# Patient Record
Sex: MALE | Race: WHITE | NOT HISPANIC OR LATINO | Employment: FULL TIME | URBAN - METROPOLITAN AREA
[De-identification: names, ages, dates, MRNs, and addresses within clinical notes are randomized per-mention and may not be internally consistent; named-entity substitution may affect disease eponyms.]

---

## 2023-08-01 ENCOUNTER — OFFICE VISIT (OUTPATIENT)
Dept: ORTHOPEDICS | Facility: CLINIC | Age: 58
End: 2023-08-01
Payer: COMMERCIAL

## 2023-08-01 DIAGNOSIS — G56.21 ULNAR NEUROPATHY OF RIGHT UPPER EXTREMITY: Primary | ICD-10-CM

## 2023-08-01 DIAGNOSIS — G56.01 CARPAL TUNNEL SYNDROME OF RIGHT WRIST: ICD-10-CM

## 2023-08-01 PROCEDURE — 1159F MED LIST DOCD IN RCRD: CPT | Mod: CPTII,S$GLB,, | Performed by: PHYSICIAN ASSISTANT

## 2023-08-01 PROCEDURE — 1160F RVW MEDS BY RX/DR IN RCRD: CPT | Mod: CPTII,S$GLB,, | Performed by: PHYSICIAN ASSISTANT

## 2023-08-01 PROCEDURE — 1159F PR MEDICATION LIST DOCUMENTED IN MEDICAL RECORD: ICD-10-PCS | Mod: CPTII,S$GLB,, | Performed by: PHYSICIAN ASSISTANT

## 2023-08-01 PROCEDURE — 99999 PR PBB SHADOW E&M-NEW PATIENT-LVL III: ICD-10-PCS | Mod: PBBFAC,,, | Performed by: PHYSICIAN ASSISTANT

## 2023-08-01 PROCEDURE — 1160F PR REVIEW ALL MEDS BY PRESCRIBER/CLIN PHARMACIST DOCUMENTED: ICD-10-PCS | Mod: CPTII,S$GLB,, | Performed by: PHYSICIAN ASSISTANT

## 2023-08-01 PROCEDURE — 99999 PR PBB SHADOW E&M-NEW PATIENT-LVL III: CPT | Mod: PBBFAC,,, | Performed by: PHYSICIAN ASSISTANT

## 2023-08-01 PROCEDURE — 4010F PR ACE/ARB THEARPY RXD/TAKEN: ICD-10-PCS | Mod: CPTII,S$GLB,, | Performed by: PHYSICIAN ASSISTANT

## 2023-08-01 PROCEDURE — 4010F ACE/ARB THERAPY RXD/TAKEN: CPT | Mod: CPTII,S$GLB,, | Performed by: PHYSICIAN ASSISTANT

## 2023-08-01 PROCEDURE — 99204 OFFICE O/P NEW MOD 45 MIN: CPT | Mod: S$GLB,,, | Performed by: PHYSICIAN ASSISTANT

## 2023-08-01 PROCEDURE — 99204 PR OFFICE/OUTPT VISIT, NEW, LEVL IV, 45-59 MIN: ICD-10-PCS | Mod: S$GLB,,, | Performed by: PHYSICIAN ASSISTANT

## 2023-08-01 NOTE — PROGRESS NOTES
8/1/2023    Chief Complaint:  Chief Complaint   Patient presents with    Right Hand - Numbness       HPI:  Albert Curtis is a 58 y.o. male, who presents to clinic today for evaluation of his right hand numbness, weakness, and tingling.  Denies any significant pain associated with the numbness and tingling.  States he does have nocturnal paresthesias.  States he is noticed symptoms approximately 1 month ago.  Denies any previous medical treatment for this issue.  Denies any acute injuries he can recall.  Denies any other complaints at this time.    PMHX:  Past Medical History:   Diagnosis Date    Hypertension        PSHX:  History reviewed. No pertinent surgical history.    FMHX:  History reviewed. No pertinent family history.    SOCHX:  Social History     Tobacco Use    Smoking status: Not on file    Smokeless tobacco: Not on file   Substance Use Topics    Alcohol use: Not on file       ALLERGIES:  Patient has no known allergies.    CURRENT MEDICATIONS:  Current Outpatient Medications on File Prior to Visit   Medication Sig Dispense Refill    RAMIPRIL ORAL ramipril Take No date recorded No form recorded No frequency recorded No route recorded No set duration recorded No set duration amount recorded active No dosage strength recorded No dosage strength units of measure recorded       No current facility-administered medications on file prior to visit.       REVIEW OF SYSTEMS:  Review of Systems   Constitutional: Negative.    HENT: Negative.     Eyes: Negative.    Respiratory: Negative.     Cardiovascular: Negative.    Gastrointestinal: Negative.    Genitourinary: Negative.    Musculoskeletal: Negative.    Skin: Negative.    Neurological:  Positive for tingling and weakness.   Endo/Heme/Allergies: Negative.    Psychiatric/Behavioral: Negative.       GENERAL PHYSICAL EXAM:   There were no vitals taken for this visit.   GEN: well developed, well nourished, no acute distress   HENT: Normocephalic, atraumatic   EYES: No  discharge, conjunctiva normal   NECK: Supple, non-tender   PULM: No wheezing, no respiratory distress   CV: RRR   ABD: Soft, non-tender    ORTHO EXAM:   Examination of the right hand reveals atrophy of the 1st webspace musculature and hypothenar musculature.  No evidence of thenar atrophy.  Able make composite fist and fully extend all fingers.  No significant tenderness to palpation throughout the entirety of the right hand/wrist.  5/5 /intrinsic strength.  5/5 thenar strength.  4/5 thumb adduction strength.  4/5 hypothenar strength.  Positive cubital Tinel's test.  Positive carpal Tinel's test.  Positive Durkan's test.  Normal sensation in the radial and median nerve distributions.  Reduced sensation in the ulnar nerve distribution.  Capillary refill less than 2 seconds in all fingers.    RADIOLOGY:   None.    ASSESSMENT:   Right carpal tunnel syndrome, severe right cubital tunnel syndrome    PLAN:  1. I discussed with Albert Curtis the carpal tunnel syndrome and cubital tunnel syndrome pathology and treatment options in detail during today's visit.  After treatment options were discussed, we decided the best course of action this time is perform an EMG nerve conduction study to evaluate the severity of his carpal tunnel and cubital tunnel syndrome.  We discussed in the meantime we will perform nighttime splinting via a right carpal tunnel splint.  He verbally agreed with the treatment plan.    2.  He was scheduled for an EMG nerve conduction study of the right upper extremity in clinic today.      3. He was provided a right carpal tunnel splint in clinic today.  He was instructed to wear it every night until seen again in clinic.  He verbalized understanding.      4. I would like him follow-up in clinic after the EMG to discuss the results with Dr. Cooper.  He was instructed to contact the clinic for any problems or concerns in the interim.

## 2023-08-11 ENCOUNTER — OFFICE VISIT (OUTPATIENT)
Dept: PHYSICAL MEDICINE AND REHAB | Facility: CLINIC | Age: 58
End: 2023-08-11
Payer: COMMERCIAL

## 2023-08-11 VITALS — WEIGHT: 170.75 LBS

## 2023-08-11 DIAGNOSIS — G56.21 CUBITAL TUNNEL SYNDROME ON RIGHT: ICD-10-CM

## 2023-08-11 DIAGNOSIS — G56.01 RIGHT CARPAL TUNNEL SYNDROME: ICD-10-CM

## 2023-08-11 PROCEDURE — 95886 MUSC TEST DONE W/N TEST COMP: CPT | Mod: S$GLB,,, | Performed by: PHYSICAL MEDICINE & REHABILITATION

## 2023-08-11 PROCEDURE — 95909 NRV CNDJ TST 5-6 STUDIES: CPT | Mod: S$GLB,,, | Performed by: PHYSICAL MEDICINE & REHABILITATION

## 2023-08-11 PROCEDURE — 99999 PR PBB SHADOW E&M-EST. PATIENT-LVL III: CPT | Mod: PBBFAC,,, | Performed by: PHYSICAL MEDICINE & REHABILITATION

## 2023-08-11 PROCEDURE — 95909 PR NERVE CONDUCTION STUDY; 5-6 STUDIES: ICD-10-PCS | Mod: S$GLB,,, | Performed by: PHYSICAL MEDICINE & REHABILITATION

## 2023-08-11 PROCEDURE — 95886 PR EMG COMPLETE, W/ NERVE CONDUCTION STUDIES, 5+ MUSCLES: ICD-10-PCS | Mod: S$GLB,,, | Performed by: PHYSICAL MEDICINE & REHABILITATION

## 2023-08-11 PROCEDURE — 99999 PR PBB SHADOW E&M-EST. PATIENT-LVL III: ICD-10-PCS | Mod: PBBFAC,,, | Performed by: PHYSICAL MEDICINE & REHABILITATION

## 2023-08-11 PROCEDURE — 99499 UNLISTED E&M SERVICE: CPT | Mod: S$GLB,,, | Performed by: PHYSICAL MEDICINE & REHABILITATION

## 2023-08-11 PROCEDURE — 99499 NO LOS: ICD-10-PCS | Mod: S$GLB,,, | Performed by: PHYSICAL MEDICINE & REHABILITATION

## 2023-08-11 NOTE — PROGRESS NOTES
[facility]  [address] [facility]  [place] [address]             Full Name: Albert Curtis Gender: Male  Patient ID: [id number] YOB: 1965  History: Right hand pain with numbness and weakness. No hx of neck surgery. Hx of alcohol abuse.       Visit Date: 8/11/2023 10:09 AM  Age: 58 Years  Examining Physician: Lexus Terry DO  Referring Physician:  CASSANDRA Atkinson      Sensory NCS      Nerve / Sites Rec. Site Onset Lat Peak Lat NP Amp PP Amp Segments Distance Velocity     ms ms µV µV  cm m/s   R Median - Digit III (Antidromic)      Wrist Dig III 5.13 6.29 8.4 18.3 Wrist - Dig III 14 27   R Ulnar - Digit V (Antidromic)      Wrist Dig V NR NR NR NR Wrist - Dig V 14 NR   R Radial - Anatomical snuff box (Forearm)      Forearm Wrist 2.10 2.90 19.8 17.5 Forearm - Wrist 10 48       Motor NCS      Nerve / Sites Muscle Latency Amplitude Amp % Duration Segments Distance Lat Diff Velocity     ms mV % ms  cm ms m/s   R Median - APB      Wrist APB 6.38 8.1 100 6.85 Wrist - APB 8        Elbow APB 10.63 8.2 101 6.88 Elbow - Wrist 22 4.25 52   R Ulnar - ADM      Wrist ADM 9.60 0.3 100 5.67 Wrist - ADM 8        B.Elbow ADM 26.69 0.1 14.4 5.48 B.Elbow - Wrist 25 17.08 15      A.Elbow ADM 34.69 0.0 6.99  A.Elbow - B.Elbow 10 8.00 12       EMG Summary Table     Spontaneous MUAP Recruitment   Muscle IA Fib PSW Fasc CRD Amp Dur. Poly Pattern   R. Deltoid N None None None None N N None N   R. Biceps brachii N None None None None N N None N   R. Triceps brachii N None None None None N N None N   R. Pronator teres N None None None None N N None N   R. Abductor pollicis brevis N None None None None N N None N   R. First dorsal interosseous N 3+ 3+ None None 2- 3+ 2+ Discrete       Summary    The motor conduction test had results outside of the specified normal range in all 2 of the tested nerves:  In the R Median - APB study  the take off latency result was increased for Wrist stimulation  In the R Ulnar - ADM  study  the take off latency result was increased for Wrist stimulation  the peak amplitude result was reduced for Wrist stimulation  the take off velocity result was reduced for B.Elbow - Wrist segment  the take off velocity result was reduced for A.Elbow - B.Elbow segment    The sensory conduction test was performed on 3 nerve(s). The results were normal in 1 nerve(s): R Radial - Anatomical snuff box (Forearm). Results outside the specified normal range were found in 2 nerve(s), as follows:  In the R Median - Digit III (Antidromic) study  the peak latency result was increased for Wrist stimulation  the peak amplitude result was reduced for Wrist stimulation  In the R Ulnar - Digit V (Antidromic) study  the response was considered absent for Wrist stimulation    The needle EMG examination was performed in 6 muscles. It was normal in 5 muscle(s): R. Deltoid, R. Biceps brachii, R. Triceps brachii, R. Pronator teres, R. Abductor pollicis brevis. The study was abnormal in 1 muscle(s), with the following distribution:  The R. First dorsal interosseous had abnormal spontaneous activity, abnormal MUP waveforms, abnormal interference pattern.       Impression:  Severe right cubital tunnel syndrome with active denervation.   Moderate right carpal tunnel syndrome, without active denervation.   No electrophysiologic evidence of right cervical radiculopathy/plexpathy, or peripheral sensory neuropathy affecting the right upper extremity.       ------------------------------  Lexus Terry,

## 2023-08-14 ENCOUNTER — OFFICE VISIT (OUTPATIENT)
Dept: ORTHOPEDICS | Facility: CLINIC | Age: 58
End: 2023-08-14
Payer: COMMERCIAL

## 2023-08-14 VITALS — WEIGHT: 170 LBS

## 2023-08-14 DIAGNOSIS — G56.21 CUBITAL TUNNEL SYNDROME ON RIGHT: ICD-10-CM

## 2023-08-14 DIAGNOSIS — G56.01 CARPAL TUNNEL SYNDROME ON RIGHT: Primary | ICD-10-CM

## 2023-08-14 PROCEDURE — 99999 PR PBB SHADOW E&M-EST. PATIENT-LVL II: ICD-10-PCS | Mod: PBBFAC,,, | Performed by: ORTHOPAEDIC SURGERY

## 2023-08-14 PROCEDURE — 99204 PR OFFICE/OUTPT VISIT, NEW, LEVL IV, 45-59 MIN: ICD-10-PCS | Mod: S$GLB,,, | Performed by: ORTHOPAEDIC SURGERY

## 2023-08-14 PROCEDURE — 4010F ACE/ARB THERAPY RXD/TAKEN: CPT | Mod: CPTII,S$GLB,, | Performed by: ORTHOPAEDIC SURGERY

## 2023-08-14 PROCEDURE — 1159F MED LIST DOCD IN RCRD: CPT | Mod: CPTII,S$GLB,, | Performed by: ORTHOPAEDIC SURGERY

## 2023-08-14 PROCEDURE — 99204 OFFICE O/P NEW MOD 45 MIN: CPT | Mod: S$GLB,,, | Performed by: ORTHOPAEDIC SURGERY

## 2023-08-14 PROCEDURE — 99999 PR PBB SHADOW E&M-EST. PATIENT-LVL II: CPT | Mod: PBBFAC,,, | Performed by: ORTHOPAEDIC SURGERY

## 2023-08-14 PROCEDURE — 1159F PR MEDICATION LIST DOCUMENTED IN MEDICAL RECORD: ICD-10-PCS | Mod: CPTII,S$GLB,, | Performed by: ORTHOPAEDIC SURGERY

## 2023-08-14 PROCEDURE — 4010F PR ACE/ARB THEARPY RXD/TAKEN: ICD-10-PCS | Mod: CPTII,S$GLB,, | Performed by: ORTHOPAEDIC SURGERY

## 2023-08-14 NOTE — H&P (VIEW-ONLY)
8/14/2023    Chief Complaint:  Chief Complaint   Patient presents with    Right Hand - Pain     EMG results     Right Elbow - Pain     EMG results        HPI:  Albert Curtis is a 58 y.o. male, who presents to clinic today has a history of numbness and tingling to his right hand.  He states he has also had severe weakness.  He was noted have severe cubital tunnel syndrome and carpal tunnel syndrome.  He was sent for nerve conduction test.  He is here today for further evaluation and treatment options.    PMHX:  Past Medical History:   Diagnosis Date    Hypertension        PSHX:  No past surgical history on file.    FMHX:  No family history on file.    SOCHX:  Social History     Tobacco Use    Smoking status: Never    Smokeless tobacco: Never   Substance Use Topics    Alcohol use: Not on file       ALLERGIES:  Patient has no known allergies.    CURRENT MEDICATIONS:  Current Outpatient Medications on File Prior to Visit   Medication Sig Dispense Refill    RAMIPRIL ORAL ramipril Take No date recorded No form recorded No frequency recorded No route recorded No set duration recorded No set duration amount recorded active No dosage strength recorded No dosage strength units of measure recorded       No current facility-administered medications on file prior to visit.       REVIEW OF SYSTEMS:  ROS    GENERAL PHYSICAL EXAM:   Wt 77.1 kg (170 lb)    GEN: well developed, well nourished, no acute distress   HENT: Normocephalic, atraumatic   EYES: No discharge, conjunctiva normal   NECK: Supple, non-tender   PULM: No wheezing, no respiratory distress   CV: RRR   ABD: Soft, non-tender    ORTHO EXAM:   Examination of the right upper extremity reveals that there is no edema.  He does have some changes consistent with chronic exposure to chemicals or cement.  Palpation does not produce tenderness but he has severe decreased sensation in the ulnar distribution with mild in the median distribution.  He does have intrinsic and hypothenar  muscular atrophy.  Thenar musculature appears to be intact.  He reports overall intact sensation in the median and radial distributions.  He has a markedly positive Tinel's over the cubital tunnel and mildly positive Tinel's at the carpal tunnel.  He also has a positive Durkan's test at the carpal tunnel.  He has 3/5 strength of the intrinsic musculature of the hand and 5/5 strength of the thenar musculature.  He does have a 2+ radial pulse    RADIOLOGY:   EMG nerve conduction study has been reviewed.  He was noted have profound right cubital tunnel syndrome and severe right carpal tunnel syndrome.  There is evidence of motor dysfunction in the ulnar distribution    ASSESSMENT:   Right cubital tunnel and carpal tunnel syndromes    PLAN:  1. I have discussed treatment with the patient.  I have discussed the possibility of carpal tunnel release and ulnar nerve decompression with possible transposition.  After discussion of the risks and benefits of the procedure informed consent has been obtained    2.  Will proceed with right carpal tunnel release and right ulnar nerve decompression at the elbow with possible transposition     Three.  He will follow up with me 2 weeks postoperatively

## 2023-08-16 ENCOUNTER — TELEPHONE (OUTPATIENT)
Dept: ORTHOPEDICS | Facility: CLINIC | Age: 58
End: 2023-08-16
Payer: COMMERCIAL

## 2023-08-16 NOTE — TELEPHONE ENCOUNTER
Returned call to pt, he stated he wants to verify he can schedule next month, he stated he will call back once he confirms a sx date with work.

## 2023-08-16 NOTE — TELEPHONE ENCOUNTER
----- Message from Obdulio Santos sent at 8/16/2023  3:46 PM CDT -----  Regarding: wants to zaria Sx, call pt   Contact: pt   wants to zaria Sx, call pt

## 2023-08-17 ENCOUNTER — TELEPHONE (OUTPATIENT)
Dept: ORTHOPEDICS | Facility: CLINIC | Age: 58
End: 2023-08-17
Payer: COMMERCIAL

## 2023-08-17 NOTE — TELEPHONE ENCOUNTER
----- Message from Isaias Lopes MA sent at 8/17/2023 12:53 PM CDT -----  Contact: patient  Wants to schedule surgery for 9/5/23.    Call back number is [phone]

## 2023-08-21 DIAGNOSIS — G56.01 CARPAL TUNNEL SYNDROME ON RIGHT: Primary | ICD-10-CM

## 2023-08-21 DIAGNOSIS — G56.21 CUBITAL TUNNEL SYNDROME ON RIGHT: ICD-10-CM

## 2023-08-21 RX ORDER — MUPIROCIN 20 MG/G
OINTMENT TOPICAL
Status: CANCELLED | OUTPATIENT
Start: 2023-08-21

## 2023-09-01 ENCOUNTER — ANESTHESIA EVENT (OUTPATIENT)
Dept: SURGERY | Facility: HOSPITAL | Age: 58
End: 2023-09-01
Payer: COMMERCIAL

## 2023-09-05 ENCOUNTER — ANESTHESIA (OUTPATIENT)
Dept: SURGERY | Facility: HOSPITAL | Age: 58
End: 2023-09-05
Payer: COMMERCIAL

## 2023-09-05 ENCOUNTER — HOSPITAL ENCOUNTER (OUTPATIENT)
Facility: HOSPITAL | Age: 58
Discharge: HOME | End: 2023-09-05
Attending: ORTHOPAEDIC SURGERY | Admitting: ORTHOPAEDIC SURGERY
Payer: COMMERCIAL

## 2023-09-05 DIAGNOSIS — G56.21 CUBITAL TUNNEL SYNDROME ON RIGHT: ICD-10-CM

## 2023-09-05 DIAGNOSIS — G56.01 CARPAL TUNNEL SYNDROME ON RIGHT: ICD-10-CM

## 2023-09-05 PROCEDURE — D9220A PRA ANESTHESIA: ICD-10-PCS | Mod: ANES,,, | Performed by: ANESTHESIOLOGY

## 2023-09-05 PROCEDURE — 64718 REVISE ULNAR NERVE AT ELBOW: CPT | Mod: RT,,, | Performed by: ORTHOPAEDIC SURGERY

## 2023-09-05 PROCEDURE — 25000003 PHARM REV CODE 250: Mod: PO | Performed by: ORTHOPAEDIC SURGERY

## 2023-09-05 PROCEDURE — 63600175 PHARM REV CODE 636 W HCPCS: Mod: PO | Performed by: ANESTHESIOLOGY

## 2023-09-05 PROCEDURE — 36000706: Mod: PO | Performed by: ORTHOPAEDIC SURGERY

## 2023-09-05 PROCEDURE — 25000003 PHARM REV CODE 250: Mod: PO | Performed by: NURSE ANESTHETIST, CERTIFIED REGISTERED

## 2023-09-05 PROCEDURE — 71000033 HC RECOVERY, INTIAL HOUR: Mod: PO | Performed by: ORTHOPAEDIC SURGERY

## 2023-09-05 PROCEDURE — 63600175 PHARM REV CODE 636 W HCPCS: Mod: PO | Performed by: NURSE ANESTHETIST, CERTIFIED REGISTERED

## 2023-09-05 PROCEDURE — 64721 PR REVISE MEDIAN N/CARPAL TUNNEL SURG: ICD-10-PCS | Mod: 51,RT,, | Performed by: ORTHOPAEDIC SURGERY

## 2023-09-05 PROCEDURE — D9220A PRA ANESTHESIA: ICD-10-PCS | Mod: CRNA,,, | Performed by: NURSE ANESTHETIST, CERTIFIED REGISTERED

## 2023-09-05 PROCEDURE — 63600175 PHARM REV CODE 636 W HCPCS: Mod: PO | Performed by: PHYSICIAN ASSISTANT

## 2023-09-05 PROCEDURE — D9220A PRA ANESTHESIA: Mod: ANES,,, | Performed by: ANESTHESIOLOGY

## 2023-09-05 PROCEDURE — 64718 PR REVISE ULNAR NERVE AT ELBOW: ICD-10-PCS | Mod: RT,,, | Performed by: ORTHOPAEDIC SURGERY

## 2023-09-05 PROCEDURE — D9220A PRA ANESTHESIA: Mod: CRNA,,, | Performed by: NURSE ANESTHETIST, CERTIFIED REGISTERED

## 2023-09-05 PROCEDURE — 37000008 HC ANESTHESIA 1ST 15 MINUTES: Mod: PO | Performed by: ORTHOPAEDIC SURGERY

## 2023-09-05 PROCEDURE — 64721 CARPAL TUNNEL SURGERY: CPT | Mod: 51,RT,, | Performed by: ORTHOPAEDIC SURGERY

## 2023-09-05 PROCEDURE — 63600175 PHARM REV CODE 636 W HCPCS: Mod: PO | Performed by: ORTHOPAEDIC SURGERY

## 2023-09-05 PROCEDURE — 37000009 HC ANESTHESIA EA ADD 15 MINS: Mod: PO | Performed by: ORTHOPAEDIC SURGERY

## 2023-09-05 PROCEDURE — 36000707: Mod: PO | Performed by: ORTHOPAEDIC SURGERY

## 2023-09-05 PROCEDURE — 71000015 HC POSTOP RECOV 1ST HR: Mod: PO | Performed by: ORTHOPAEDIC SURGERY

## 2023-09-05 RX ORDER — MIDAZOLAM HYDROCHLORIDE 1 MG/ML
INJECTION INTRAMUSCULAR; INTRAVENOUS
Status: DISCONTINUED | OUTPATIENT
Start: 2023-09-05 | End: 2023-09-05

## 2023-09-05 RX ORDER — DIPHENHYDRAMINE HYDROCHLORIDE 50 MG/ML
25 INJECTION INTRAMUSCULAR; INTRAVENOUS EVERY 6 HOURS PRN
Status: DISCONTINUED | OUTPATIENT
Start: 2023-09-05 | End: 2023-09-05 | Stop reason: HOSPADM

## 2023-09-05 RX ORDER — LIDOCAINE HYDROCHLORIDE 10 MG/ML
INJECTION, SOLUTION EPIDURAL; INFILTRATION; INTRACAUDAL; PERINEURAL
Status: DISCONTINUED | OUTPATIENT
Start: 2023-09-05 | End: 2023-09-05 | Stop reason: HOSPADM

## 2023-09-05 RX ORDER — LIDOCAINE HYDROCHLORIDE 10 MG/ML
1 INJECTION, SOLUTION EPIDURAL; INFILTRATION; INTRACAUDAL; PERINEURAL ONCE
Status: DISCONTINUED | OUTPATIENT
Start: 2023-09-05 | End: 2023-09-05 | Stop reason: HOSPADM

## 2023-09-05 RX ORDER — SODIUM CHLORIDE, SODIUM LACTATE, POTASSIUM CHLORIDE, CALCIUM CHLORIDE 600; 310; 30; 20 MG/100ML; MG/100ML; MG/100ML; MG/100ML
INJECTION, SOLUTION INTRAVENOUS CONTINUOUS
Status: DISCONTINUED | OUTPATIENT
Start: 2023-09-05 | End: 2023-09-05 | Stop reason: HOSPADM

## 2023-09-05 RX ORDER — OXYCODONE HYDROCHLORIDE 5 MG/1
5 TABLET ORAL EVERY 4 HOURS PRN
Qty: 10 TABLET | Refills: 0 | Status: SHIPPED | OUTPATIENT
Start: 2023-09-05 | End: 2023-10-09

## 2023-09-05 RX ORDER — ONDANSETRON 8 MG/1
8 TABLET, ORALLY DISINTEGRATING ORAL EVERY 8 HOURS PRN
Qty: 2 TABLET | Refills: 0 | Status: SHIPPED | OUTPATIENT
Start: 2023-09-05 | End: 2023-10-09

## 2023-09-05 RX ORDER — CEFAZOLIN SODIUM 2 G/50ML
2 SOLUTION INTRAVENOUS
Status: COMPLETED | OUTPATIENT
Start: 2023-09-05 | End: 2023-09-05

## 2023-09-05 RX ORDER — BUPIVACAINE HYDROCHLORIDE 5 MG/ML
INJECTION, SOLUTION EPIDURAL; INTRACAUDAL
Status: DISCONTINUED | OUTPATIENT
Start: 2023-09-05 | End: 2023-09-05 | Stop reason: HOSPADM

## 2023-09-05 RX ORDER — MUPIROCIN 20 MG/G
OINTMENT TOPICAL
Status: DISCONTINUED | OUTPATIENT
Start: 2023-09-05 | End: 2023-09-05 | Stop reason: HOSPADM

## 2023-09-05 RX ORDER — DEXAMETHASONE SODIUM PHOSPHATE 4 MG/ML
INJECTION, SOLUTION INTRA-ARTICULAR; INTRALESIONAL; INTRAMUSCULAR; INTRAVENOUS; SOFT TISSUE
Status: DISCONTINUED | OUTPATIENT
Start: 2023-09-05 | End: 2023-09-05

## 2023-09-05 RX ORDER — SODIUM CHLORIDE 0.9 % (FLUSH) 0.9 %
10 SYRINGE (ML) INJECTION ONCE
Status: DISCONTINUED | OUTPATIENT
Start: 2023-09-05 | End: 2023-09-05 | Stop reason: HOSPADM

## 2023-09-05 RX ORDER — ACETAMINOPHEN 10 MG/ML
INJECTION, SOLUTION INTRAVENOUS
Status: DISCONTINUED | OUTPATIENT
Start: 2023-09-05 | End: 2023-09-05

## 2023-09-05 RX ORDER — MEPERIDINE HYDROCHLORIDE 50 MG/ML
12.5 INJECTION INTRAMUSCULAR; INTRAVENOUS; SUBCUTANEOUS ONCE
Status: DISCONTINUED | OUTPATIENT
Start: 2023-09-05 | End: 2023-09-05 | Stop reason: HOSPADM

## 2023-09-05 RX ORDER — ONDANSETRON 2 MG/ML
INJECTION INTRAMUSCULAR; INTRAVENOUS
Status: DISCONTINUED | OUTPATIENT
Start: 2023-09-05 | End: 2023-09-05

## 2023-09-05 RX ORDER — OXYCODONE HYDROCHLORIDE 5 MG/1
5 TABLET ORAL
Status: DISCONTINUED | OUTPATIENT
Start: 2023-09-05 | End: 2023-09-05 | Stop reason: HOSPADM

## 2023-09-05 RX ORDER — PROPOFOL 10 MG/ML
VIAL (ML) INTRAVENOUS
Status: DISCONTINUED | OUTPATIENT
Start: 2023-09-05 | End: 2023-09-05

## 2023-09-05 RX ORDER — SODIUM CHLORIDE 0.9 G/100ML
IRRIGANT IRRIGATION
Status: DISCONTINUED | OUTPATIENT
Start: 2023-09-05 | End: 2023-09-05 | Stop reason: HOSPADM

## 2023-09-05 RX ORDER — HYDROMORPHONE HYDROCHLORIDE 2 MG/ML
0.2 INJECTION, SOLUTION INTRAMUSCULAR; INTRAVENOUS; SUBCUTANEOUS EVERY 5 MIN PRN
Status: DISCONTINUED | OUTPATIENT
Start: 2023-09-05 | End: 2023-09-05 | Stop reason: HOSPADM

## 2023-09-05 RX ORDER — FENTANYL CITRATE 50 UG/ML
INJECTION, SOLUTION INTRAMUSCULAR; INTRAVENOUS
Status: DISCONTINUED | OUTPATIENT
Start: 2023-09-05 | End: 2023-09-05

## 2023-09-05 RX ORDER — LIDOCAINE HYDROCHLORIDE 20 MG/ML
INJECTION, SOLUTION EPIDURAL; INFILTRATION; INTRACAUDAL; PERINEURAL
Status: DISCONTINUED | OUTPATIENT
Start: 2023-09-05 | End: 2023-09-05

## 2023-09-05 RX ADMIN — FENTANYL CITRATE 25 MCG: 50 INJECTION, SOLUTION INTRAMUSCULAR; INTRAVENOUS at 09:09

## 2023-09-05 RX ADMIN — ACETAMINOPHEN 1000 MG: 10 INJECTION, SOLUTION INTRAVENOUS at 08:09

## 2023-09-05 RX ADMIN — ONDANSETRON 4 MG: 2 INJECTION, SOLUTION INTRAMUSCULAR; INTRAVENOUS at 08:09

## 2023-09-05 RX ADMIN — DEXAMETHASONE SODIUM PHOSPHATE 4 MG: 4 INJECTION, SOLUTION INTRAMUSCULAR; INTRAVENOUS at 08:09

## 2023-09-05 RX ADMIN — CEFAZOLIN SODIUM 2 G: 2 SOLUTION INTRAVENOUS at 08:09

## 2023-09-05 RX ADMIN — PROPOFOL 150 MG: 10 INJECTION, EMULSION INTRAVENOUS at 08:09

## 2023-09-05 RX ADMIN — SODIUM CHLORIDE, SODIUM LACTATE, POTASSIUM CHLORIDE, AND CALCIUM CHLORIDE: .6; .31; .03; .02 INJECTION, SOLUTION INTRAVENOUS at 08:09

## 2023-09-05 RX ADMIN — FENTANYL CITRATE 25 MCG: 50 INJECTION, SOLUTION INTRAMUSCULAR; INTRAVENOUS at 08:09

## 2023-09-05 RX ADMIN — LIDOCAINE HYDROCHLORIDE 100 MG: 20 INJECTION, SOLUTION EPIDURAL; INFILTRATION; INTRACAUDAL; PERINEURAL at 08:09

## 2023-09-05 RX ADMIN — MIDAZOLAM HYDROCHLORIDE 2 MG: 1 INJECTION, SOLUTION INTRAMUSCULAR; INTRAVENOUS at 08:09

## 2023-09-05 NOTE — ANESTHESIA PROCEDURE NOTES
Intubation    Date/Time: 9/5/2023 8:42 AM    Performed by: Sowmya Sage CRNA  Authorized by: Singh Mosqueda MD    Intubation:     Induction:  Intravenous    Intubated:  Postinduction    Mask Ventilation:  Easy with oral airway    Attempts:  1    Attempted By:  CRNA    Difficult Airway Encountered?: No      Complications:  None    Airway Device:  Supraglottic airway/LMA    Airway Device Size:  4.0    Style/Cuff Inflation:  Cuffed (inflated to minimal occlusive pressure)    Placement Verified By:  Capnometry    Complicating Factors:  None    Findings Post-Intubation:  BS equal bilateral and atraumatic/condition of teeth unchanged

## 2023-09-05 NOTE — PATIENT INSTRUCTIONS
Procedure:  Right carpal tunnel release and right ulnar nerve decompression at the elbow    1. Keep the splint clean, dry, and in place until follow-up. Do not take it off and do not get it wet.    2. Please keep the right upper extremity elevated for the 1st 24-48 hours to prevent further swelling    3. Flexion and extension of the exposed fingers is allowed but do not attempt to lift or push off with the right arm or hand    4. Pain medication and nausea medication have been prescribed. Please take them as necessary.    5. If there are any questions or concerns please call Dr. Cooper's office at [phone]    6. Follow-up with Dr. Cooper in 2 weeks

## 2023-09-05 NOTE — OP NOTE
NAME@  1965    DATE OF SURGERY: 9/5/2023     PRE-OPERATIVE DIAGNOSIS: right Carpal Tunnel Syndrome, right Cubital Tunnel Syndrome    POST-OPERATIVE DIAGNOSIS: right Carpal Tunnel Syndrome, right Cubital Tunnel Syndrome    ANESTHESIA TYPE: General    BLOOD LOSS:  10-15 cc    TOURNIQUET TIME:  14 minutes    SURGEON: Dr. Cooper    ASSISTANT:  Lucia Navarro    PROCEDURE: right Carpal Tunnel Release, right in situ Ulnar nerve decompression     IMPLANTS: None    SPECIMENS: None    INDICATION:     Mr. Curtis presented to my clinic with a history of severe ulnar neuropathy with cubital tunnel syndrome.  He had muscle atrophy.  He also was having some symptoms of carpal tunnel syndrome.  Due to the severity of the symptoms I discussed the possibility of ulnar nerve decompression at the elbow and carpal tunnel release.  Informed consent was then obtained.    PROCEDURE IN DETAIL:     Mr. Curtis was transported to the operating room and was placed supine on the operating room table. All appropriate points were padded. The right hand and arm was prepped and draped in the normal sterile fashion. Time out was called. The correct patient, correct operative site, correct procedure, antibiotic administration which consisted of 2 g of Ancef, and allergies to medications which are to Patient has no known allergies.  were reviewed. Time in was then called.     Attention was 1st turned to the right elbow. An 6 cm incision was made  posterior to the medial epicondyle. This incision was carried through the skin. Subcutaneous tissues were dissected with tenotomy scissors. The cubital tunnel was identified. The ulnar nerve was identified proximally. The fascia overlying the proximal portion of the nerve including Infante's fascia overlying the cubital tunnel at the medial epicondyle was released under direct visualization. The release was carried proximally through the arcade of Deann. A portion of the medial intermuscular septum  was excised. Dissection was carried distally through the remainder of the cubital tunnel. And the aponeurosis between the 2 heads of the FCU was released. There were noted to be areas of tethering at this location. The ulnar nerve was then noted to be completely freed proximally and distally. The elbow was taken through a range of motion and there was no significant compression at the medial epicondyle and no significant subluxation of the nerve. The wound was then copiously irrigated. The wound was covered with a damp lap sponge.    Attention was turned to right hand where a 3 cm incision was made in the palm of the hand. This was carried through the skin. Subcutaneous tissues were dissected bluntly with tenotomy scissors. The superficial palmar fascia was identified and was split in line with its fibers. The transverse carpal ligament was then identified and was incised for a distance of approximately 1 cm sharply.The median nerve was visualized and a carpal tunnel sled was placed below the transverse carpal ligament but above the median nerve. Blunt dissection proximally over the transverse carpal ligament was performed and elevator was placed just above the transverse carpal ligament proximally. The ligament was identified. There were noted to be no penetrating nerve branches and at that point under direct visualization the carpal tunnel knife was used to incise the proximal transverse carpal ligament. Attention was then turned to the distal portion of the transverse carpal ligament. The carpal tunnel sled was again placed underneath the transverse carpal ligament but above the median nerve distally. Blunt dissection above the transverse carpal ligament distally was performed and an elevator was placed. The distal portion of the transverse carpal ligament was visualized and there were noted to be no penetrating branches of the nerve. At that point, tenotomy scissors were used to transect the distal portion of  the transverse carpal ligament under direct visualization. The median nerve was then visualized. There were noted to be no specific lesions of the nerve and all of its branches were noted to be intact. The wound was then irrigated copiously.     The tourniquet was let down and meticulous hemostasis was obtained at the palmar wound. The wound was then closed superficially with 4-0 nylon suture. Attention was turned to the elbow wound. Meticulous hemostasis was obtained at the elbow wound and that wound was closed with a combination of 3-0 Vicryl subcutaneous sutures and 3-0 nylon superficial sutures. Both wounds were dressed with Xeroform, gauze padding, cast padding and a long-arm posterior splint was placed.      The patient was awakened from anesthesia and was transported to the recovery room in stable condition. All lap, needle, sponge, and equipment counts were correct at the end of the case.    POST-OPERATIVE PLAN:     The patient will keep the splint in place clean dry and intact until  he  follows up with me in 2 weeks. He has a 2-3 pound weight limit for lifting with the operative extremity. He will keep a splint in place for a total of 4 weeks prior to beginning range of motion at the elbow.

## 2023-09-05 NOTE — TRANSFER OF CARE
"Anesthesia Transfer of Care Note    Patient: Albert Curtis    Procedure(s) Performed: Procedure(s) (LRB):  Right carpal tunnel release (Right)  Right ulnar nerve decompression at the elbow (Right)    Patient location: PACU    Anesthesia Type: general    Transport from OR: Transported from OR on room air with adequate spontaneous ventilation    Post pain: adequate analgesia    Post assessment: no apparent anesthetic complications and tolerated procedure well    Post vital signs: stable    Level of consciousness: awake and responds to stimulation    Nausea/Vomiting: no nausea/vomiting    Complications: none    Transfer of care protocol was followed      Last vitals:   Visit Vitals  BP (!) 183/97   Pulse 93   Temp 36.6 °C (97.9 °F) (Temporal)   Resp 18   Ht 5' 8" (1.727 m)   Wt 77.1 kg (170 lb)   SpO2 97%   BMI 25.85 kg/m²     "

## 2023-09-05 NOTE — ANESTHESIA POSTPROCEDURE EVALUATION
Anesthesia Post Evaluation    Patient: Albert Curtis    Procedure(s) Performed: Procedure(s) (LRB):  Right carpal tunnel release (Right)  Right ulnar nerve decompression at the elbow (Right)    Final Anesthesia Type: general      Patient location during evaluation: PACU  Patient participation: Yes- Able to Participate  Level of consciousness: awake and alert  Post-procedure vital signs: reviewed and stable  Pain management: adequate  Airway patency: patent    PONV status at discharge: No PONV  Anesthetic complications: no      Cardiovascular status: blood pressure returned to baseline and hypertensive  Respiratory status: unassisted  Hydration status: euvolemic  Follow-up not needed.          Vitals Value Taken Time   /100 09/05/23 1020   Temp 36.7 °C (98 °F) 09/05/23 1020   Pulse 69 09/05/23 1020   Resp 20 09/05/23 1020   SpO2 100 % 09/05/23 1020         Event Time   Out of Recovery 10:13:00         Pain/Martha Score: Martha Score: 10 (9/5/2023 10:10 AM)

## 2023-09-05 NOTE — DISCHARGE INSTRUCTIONS
"Discharge Instructions: After Your Surgery/Procedure  Youve just had surgery. During surgery you were given medicine called anesthesia to keep you relaxed and free of pain. After surgery you may have some pain or nausea. This is common. Here are some tips for feeling better and getting well after surgery.     Stay on schedule with your medication.   Going home  Your doctor or nurse will show you how to take care of yourself when you go home. He or she will also answer your questions. Have an adult family member or friend drive you home.      For your safety we recommend these precaution for the first 24 hours after your procedure:  Do not drive or use heavy equipment.  Do not make important decisions or sign legal papers.  Do not drink alcohol.  Have someone stay with you, if needed. He or she can watch for problems and help keep you safe.  Your concentration, balance, coordination, and judgement may be impaired for many hours after anesthesia.  Use caution when ambulating or standing up.     You may feel weak and "washed out" after anesthesia and surgery.      Subtle residual effects of general anesthesia or sedation with regional / local anesthesia can last more than 24 hours.  Rest for the remainder of the day or longer if your Doctor/Surgeon has advised you to do so.  Although you may feel normal within the first 24 hours, your reflexes and mental ability may be impaired without you realizing it.  You may feel dizzy, lightheaded or sleepy for 24 hours or longer.      Be sure to go to all follow-up visits with your doctor. And rest after your surgery for as long as your doctor tells you to.  Coping with pain  If you have pain after surgery, pain medicine will help you feel better. Take it as told, before pain becomes severe. Also, ask your doctor or pharmacist about other ways to control pain. This might be with heat, ice, or relaxation. And follow any other instructions your surgeon or nurse gives you.  Tips " for taking pain medicine  To get the best relief possible, remember these points:  Pain medicines can upset your stomach. Taking them with a little food may help.  Most pain relievers taken by mouth need at least 20 to 30 minutes to start to work.  Taking medicine on a schedule can help you remember to take it. Try to time your medicine so that you can take it before starting an activity. This might be before you get dressed, go for a walk, or sit down for dinner.  Constipation is a common side effect of pain medicines. Call your doctor before taking any medicines such as laxatives or stool softeners to help ease constipation. Also ask if you should skip any foods. Drinking lots of fluids and eating foods such as fruits and vegetables that are high in fiber can also help. Remember, do not take laxatives unless your surgeon has prescribed them.  Drinking alcohol and taking pain medicine can cause dizziness and slow your breathing. It can even be deadly. Do not drink alcohol while taking pain medicine.  Pain medicine can make you react more slowly to things. Do not drive or run machinery while taking pain medicine.  Your health care provider may tell you to take acetaminophen to help ease your pain. Ask him or her how much you are supposed to take each day. Acetaminophen or other pain relievers may interact with your prescription medicines or other over-the-counter (OTC) drugs. Some prescription medicines have acetaminophen and other ingredients. Using both prescription and OTC acetaminophen for pain can cause you to overdose. Read the labels on your OTC medicines with care. This will help you to clearly know the list of ingredients, how much to take, and any warnings. It may also help you not take too much acetaminophen. If you have questions or do not understand the information, ask your pharmacist or health care provider to explain it to you before you take the OTC medicine.  Managing nausea  Some people have an  upset stomach after surgery. This is often because of anesthesia, pain, or pain medicine, or the stress of surgery. These tips will help you handle nausea and eat healthy foods as you get better. If you were on a special food plan before surgery, ask your doctor if you should follow it while you get better. These tips may help:  Do not push yourself to eat. Your body will tell you when to eat and how much.  Start off with clear liquids and soup. They are easier to digest.  Next try semi-solid foods, such as mashed potatoes, applesauce, and gelatin, as you feel ready.  Slowly move to solid foods. Dont eat fatty, rich, or spicy foods at first.  Do not force yourself to have 3 large meals a day. Instead eat smaller amounts more often.  Take pain medicines with a small amount of solid food, such as crackers or toast, to avoid nausea.     Call your surgeon if  You still have pain an hour after taking medicine. The medicine may not be strong enough.  You feel too sleepy, dizzy, or groggy. The medicine may be too strong.  You have side effects like nausea, vomiting, or skin changes, such as rash, itching, or hives.       If you have obstructive sleep apnea  You were given anesthesia medicine during surgery to keep you comfortable and free of pain. After surgery, you may have more apnea spells because of this medicine and other medicines you were given. The spells may last longer than usual.   At home:  Keep using the continuous positive airway pressure (CPAP) device when you sleep. Unless your health care provider tells you not to, use it when you sleep, day or night. CPAP is a common device used to treat obstructive sleep apnea.  Talk with your provider before taking any pain medicine, muscle relaxants, or sedatives. Your provider will tell you about the possible dangers of taking these medicines.  © 5264-3503 [employer]. [address] [place] [address], [place] [address]. All rights reserved. This information is  not intended as a substitute for professional medical care. Always follow your healthcare professional's instructions.

## 2023-09-05 NOTE — DISCHARGE SUMMARY
[facility] - [facility]  Discharge Note  Short Stay    Procedure(s) (LRB):  Right carpal tunnel release (Right)  Right ulnar nerve decompression at the elbow (Right)      OUTCOME: Patient tolerated treatment/procedure well without complication and is now ready for discharge.    DISPOSITION: Home or Self Care    FINAL DIAGNOSIS:  Cubital tunnel syndrome on right    FOLLOWUP: In clinic    DISCHARGE INSTRUCTIONS:    Discharge Procedure Orders   [facility] [employer] [facility] FOR HOME USE     Diet general     Activity as tolerated     Keep surgical extremity elevated     Lifting restrictions   Order Comments: Please do not lift or push off with the right arm or hand     Leave dressing on - Keep it clean, dry, and intact until clinic visit     Call MD for:  temperature >100.4     Call MD for:  persistent nausea and vomiting     Call MD for:  severe uncontrolled pain     Call MD for:  difficulty breathing, headache or visual disturbances     Call MD for:  redness, tenderness, or signs of infection (pain, swelling, redness, odor or green/yellow discharge around incision site)     Call MD for:  hives     Call MD for:  persistent dizziness or light-headedness     Call MD for:  extreme fatigue        TIME SPENT ON DISCHARGE: 15 minutes

## 2023-09-05 NOTE — ANESTHESIA PREPROCEDURE EVALUATION
09/05/2023  Albert Curtis is a 58 y.o., male.      Pre-op Assessment    I have reviewed the Patient Summary Reports.     I have reviewed the Nursing Notes. I have reviewed the NPO Status.   I have reviewed the Medications.     Review of Systems  Anesthesia Hx:  No previous Anesthesia  Denies Family Hx of Anesthesia complications.    Cardiovascular:   Hypertension    Neurological:   Peripheral Neuropathy        Physical Exam  General: Well nourished, Cooperative, Alert and Oriented    Airway:  Mallampati: II   Mouth Opening: Normal  TM Distance: Normal  Tongue: Normal  Neck ROM: Normal ROM    Dental:  Intact        Anesthesia Plan  Type of Anesthesia, risks & benefits discussed:    Anesthesia Type: Gen Supraglottic Airway  Intra-op Monitoring Plan: Standard ASA Monitors  Post Op Pain Control Plan: multimodal analgesia  Induction:  IV  Airway Plan: , Post-Induction  Informed Consent: Informed consent signed with the Patient and all parties understand the risks and agree with anesthesia plan.  All questions answered.   ASA Score: 2    Ready For Surgery From Anesthesia Perspective.     .

## 2023-09-06 VITALS
DIASTOLIC BLOOD PRESSURE: 100 MMHG | OXYGEN SATURATION: 100 % | BODY MASS INDEX: 25.76 KG/M2 | HEART RATE: 69 BPM | RESPIRATION RATE: 20 BRPM | TEMPERATURE: 98 F | HEIGHT: 68 IN | SYSTOLIC BLOOD PRESSURE: 165 MMHG | WEIGHT: 170 LBS

## 2023-09-11 ENCOUNTER — TELEPHONE (OUTPATIENT)
Dept: ORTHOPEDICS | Facility: CLINIC | Age: 58
End: 2023-09-11
Payer: COMMERCIAL

## 2023-09-11 NOTE — TELEPHONE ENCOUNTER
Spoke to patient. Patient is requesting we will out paperwork from his insurance company. I confirmed we got it and would fill it out and send it back as quickly as we could. Thanks and understanding verbalized.  ----- Message from Hue Galvan MA sent at 9/11/2023  9:54 AM CDT -----  Contact: pt  Wants call back,. Insurance paperwork   Call back

## 2023-09-18 ENCOUNTER — OFFICE VISIT (OUTPATIENT)
Dept: ORTHOPEDICS | Facility: CLINIC | Age: 58
End: 2023-09-18
Payer: COMMERCIAL

## 2023-09-18 DIAGNOSIS — G56.01 CARPAL TUNNEL SYNDROME ON RIGHT: ICD-10-CM

## 2023-09-18 DIAGNOSIS — G56.21 CUBITAL TUNNEL SYNDROME ON RIGHT: Primary | ICD-10-CM

## 2023-09-18 PROCEDURE — 4010F ACE/ARB THERAPY RXD/TAKEN: CPT | Mod: CPTII,S$GLB,, | Performed by: ORTHOPAEDIC SURGERY

## 2023-09-18 PROCEDURE — 99024 PR POST-OP FOLLOW-UP VISIT: ICD-10-PCS | Mod: S$GLB,,, | Performed by: ORTHOPAEDIC SURGERY

## 2023-09-18 PROCEDURE — 99024 POSTOP FOLLOW-UP VISIT: CPT | Mod: S$GLB,,, | Performed by: ORTHOPAEDIC SURGERY

## 2023-09-18 PROCEDURE — 1159F MED LIST DOCD IN RCRD: CPT | Mod: CPTII,S$GLB,, | Performed by: ORTHOPAEDIC SURGERY

## 2023-09-18 PROCEDURE — 99999 PR PBB SHADOW E&M-EST. PATIENT-LVL II: CPT | Mod: PBBFAC,,, | Performed by: ORTHOPAEDIC SURGERY

## 2023-09-18 PROCEDURE — 1159F PR MEDICATION LIST DOCUMENTED IN MEDICAL RECORD: ICD-10-PCS | Mod: CPTII,S$GLB,, | Performed by: ORTHOPAEDIC SURGERY

## 2023-09-18 PROCEDURE — 4010F PR ACE/ARB THEARPY RXD/TAKEN: ICD-10-PCS | Mod: CPTII,S$GLB,, | Performed by: ORTHOPAEDIC SURGERY

## 2023-09-18 PROCEDURE — 99999 PR PBB SHADOW E&M-EST. PATIENT-LVL II: ICD-10-PCS | Mod: PBBFAC,,, | Performed by: ORTHOPAEDIC SURGERY

## 2023-09-18 NOTE — PROGRESS NOTES
Mr Curtis clinic today.  He is 2 weeks status post right carpal tunnel release and right in-situ cubital tunnel release.  He is overall doing well.  He still has some numbness in his small finger     Physical exam:  Examination of the right upper extremity reveals that both incisions are healing well.  Sutures are in place.  There is minimal edema.  There is no erythema or drainage at either wound.  He does report intact sensation in the median distribution.  He still has decreased sensation over the small finger but the ring finger sensation has improved.    Assessment: Status post right carpal tunnel and right cubital tunnel releases     Plan:    1. Sutures were removed today and Steri-Strips are placed    2.  Will begin gentle range of motion but will continue with 2-3 lb weight limit    3. Will follow up in 2-3 weeks for repeat evaluation

## 2023-10-09 ENCOUNTER — OFFICE VISIT (OUTPATIENT)
Dept: ORTHOPEDICS | Facility: CLINIC | Age: 58
End: 2023-10-09
Payer: COMMERCIAL

## 2023-10-09 DIAGNOSIS — Z98.890 S/P CUBITAL TUNNEL RELEASE: ICD-10-CM

## 2023-10-09 DIAGNOSIS — Z98.890 STATUS POST CARPAL TUNNEL RELEASE: Primary | ICD-10-CM

## 2023-10-09 PROCEDURE — 99999 PR PBB SHADOW E&M-EST. PATIENT-LVL II: CPT | Mod: PBBFAC,,, | Performed by: PHYSICIAN ASSISTANT

## 2023-10-09 PROCEDURE — 1159F MED LIST DOCD IN RCRD: CPT | Mod: CPTII,S$GLB,, | Performed by: PHYSICIAN ASSISTANT

## 2023-10-09 PROCEDURE — 4010F PR ACE/ARB THEARPY RXD/TAKEN: ICD-10-PCS | Mod: CPTII,S$GLB,, | Performed by: PHYSICIAN ASSISTANT

## 2023-10-09 PROCEDURE — 99024 POSTOP FOLLOW-UP VISIT: CPT | Mod: S$GLB,,, | Performed by: PHYSICIAN ASSISTANT

## 2023-10-09 PROCEDURE — 1160F PR REVIEW ALL MEDS BY PRESCRIBER/CLIN PHARMACIST DOCUMENTED: ICD-10-PCS | Mod: CPTII,S$GLB,, | Performed by: PHYSICIAN ASSISTANT

## 2023-10-09 PROCEDURE — 99999 PR PBB SHADOW E&M-EST. PATIENT-LVL II: ICD-10-PCS | Mod: PBBFAC,,, | Performed by: PHYSICIAN ASSISTANT

## 2023-10-09 PROCEDURE — 1160F RVW MEDS BY RX/DR IN RCRD: CPT | Mod: CPTII,S$GLB,, | Performed by: PHYSICIAN ASSISTANT

## 2023-10-09 PROCEDURE — 99024 PR POST-OP FOLLOW-UP VISIT: ICD-10-PCS | Mod: S$GLB,,, | Performed by: PHYSICIAN ASSISTANT

## 2023-10-09 PROCEDURE — 1159F PR MEDICATION LIST DOCUMENTED IN MEDICAL RECORD: ICD-10-PCS | Mod: CPTII,S$GLB,, | Performed by: PHYSICIAN ASSISTANT

## 2023-10-09 PROCEDURE — 4010F ACE/ARB THERAPY RXD/TAKEN: CPT | Mod: CPTII,S$GLB,, | Performed by: PHYSICIAN ASSISTANT

## 2023-10-09 NOTE — PROGRESS NOTES
Albert Curtis is a 58 y.o. male who presents to clinic for follow-up status post right carpal tunnel release and right cubital tunnel release.  He is approximately 5 weeks status post surgery.  States overall he is doing very well.  States he does have some numbness of the right little finger, but overall his symptoms have significantly improved.  Denies any acute injuries since last visit.  Denies any other complaints at this time.    Physical Exam:  Examination of the right arm reveals well-healed surgical sites.  Presence of significant scar tissue formation noted of the carpal tunnel surgical site.  No significant tenderness palpation of the right arm.  Full intact range of motion of the right wrist.  Able make composite fist and fully extend all fingers.  Full intact range of motion of the right elbow.  5/5 /intrinsic strength.  5/5 thenar strength.  Sensation is grossly intact in the radial, ulnar, median nerve distributions.  Capillary refill less than 2 seconds in all fingers.    Radiology:  None.    Assessment:  Status post right carpal tunnel release, status post right cubital tunnel release    Plan:  1. I discussed with Albert Curtis that he is progressing appropriately in the postoperative course.  We discussed the best course of action this time is to begin gentle scar massage with vitamin-E lotion/cocoa butter daily of the carpal tunnel surgical site.  We also discussed he may gradually return to normal activity as tolerated.  We discussed he may return to work as tolerated with no restrictions starting 10/16/2023.  He verbally agreed with the treatment plan.      2. He was written a note for work stating is able to return to work on 10/16/2023.    3.  I would like him follow-up in clinic in 4 weeks for final evaluation.  He was instructed to contact clinic for any problems or concerns in the interim.

## (undated) DEVICE — DRAPE HALF SURGICAL 40X58IN

## (undated) DEVICE — BAND RUBBER STERILE 1/4X3.5IN

## (undated) DEVICE — SYR 10CC LUER LOCK

## (undated) DEVICE — DRAPE THREE-QTR REINF 53X77IN

## (undated) DEVICE — NDL 27G X 1 1/4

## (undated) DEVICE — GLOVE PROTEXIS LTX MICRO 8

## (undated) DEVICE — FORCEP STRAIGHT DISP

## (undated) DEVICE — GLOVE PROTEXIS LTX MICRO  7.5

## (undated) DEVICE — TOWEL OR DISP STRL BLUE 4/PK

## (undated) DEVICE — BLADE SURG #15 CARBON STEEL

## (undated) DEVICE — DRESSING XEROFORM FOIL PK 1X8

## (undated) DEVICE — KIT SAHARA DRAPE DRAW/LIFT

## (undated) DEVICE — BANDAGE ELAS SOFTWRAP ST 3X5YD

## (undated) DEVICE — APPLICATOR CHLORAPREP CLR 10.5

## (undated) DEVICE — BANDAGE ESMARK LATEX FREE 4INX

## (undated) DEVICE — TUBING SUC UNIV W/CONN 12FT

## (undated) DEVICE — SUT ETHILON 4-0 PS2 18 BLK

## (undated) DEVICE — BOWL STERILE LARGE 32OZ

## (undated) DEVICE — DRAPE HAND STERILE

## (undated) DEVICE — PAD CAST SPECIALIST STRL 4

## (undated) DEVICE — Device

## (undated) DEVICE — STRAP OR TABLE 5IN X 72IN

## (undated) DEVICE — ALCOHOL 70% ISOP RUBBING 4OZ

## (undated) DEVICE — CORD BIPOLAR 12 FOOT

## (undated) DEVICE — DRAPE STERI-DRAPE 1000 17X11IN

## (undated) DEVICE — DRAPE U SPLIT SHEET 54X76IN

## (undated) DEVICE — SEE L#120831

## (undated) DEVICE — GLOVE BIOGEL PI MICRO SZ 8

## (undated) DEVICE — GAUZE SPONGE 4X4 12PLY

## (undated) DEVICE — APPLICATOR CHLORAPREP ORN 26ML

## (undated) DEVICE — GOWN SMARTGOWN LVL4 X-LONG XL

## (undated) DEVICE — SUT 3-0 VICRYL / SH (J416)

## (undated) DEVICE — PAD CAST SPECIALIST STRL 3

## (undated) DEVICE — SUT ETHILON 3-0 PS2 18 BLK

## (undated) DEVICE — BANDAGE MATRIX HK LOOP 4IN 5YD

## (undated) DEVICE — TOURNIQUET SB QC DP 18X4IN